# Patient Record
Sex: FEMALE | RURAL
[De-identification: names, ages, dates, MRNs, and addresses within clinical notes are randomized per-mention and may not be internally consistent; named-entity substitution may affect disease eponyms.]

---

## 2019-07-18 ENCOUNTER — APPOINTMENT (OUTPATIENT)
Age: 69
Setting detail: DERMATOLOGY
End: 2019-07-23

## 2019-07-18 DIAGNOSIS — L20.89 OTHER ATOPIC DERMATITIS: ICD-10-CM

## 2019-07-18 DIAGNOSIS — L30.1 DYSHIDROSIS [POMPHOLYX]: ICD-10-CM

## 2019-07-18 PROCEDURE — OTHER MEDICATION COUNSELING: OTHER

## 2019-07-18 PROCEDURE — OTHER COUNSELING: TOPICAL STEROIDS: OTHER

## 2019-07-18 PROCEDURE — OTHER PRESCRIPTION SAMPLES PROVIDED: OTHER

## 2019-07-18 PROCEDURE — OTHER COUNSELING: OTHER

## 2019-07-18 PROCEDURE — 99202 OFFICE O/P NEW SF 15 MIN: CPT

## 2019-07-18 PROCEDURE — OTHER PRESCRIPTION: OTHER

## 2019-07-18 RX ORDER — PREDNISONE 10 MG/1
TABLET ORAL
Qty: 45 | Refills: 0 | Status: ERX | COMMUNITY
Start: 2019-07-18

## 2019-07-18 RX ORDER — CLOBETASOL PROPIONATE 0.5 MG/G
OINTMENT TOPICAL NIGHTLY
Qty: 1 | Refills: 0 | Status: ERX

## 2019-07-18 ASSESSMENT — LOCATION SIMPLE DESCRIPTION DERM
LOCATION SIMPLE: LEFT KNEE
LOCATION SIMPLE: LEFT PLANTAR SURFACE
LOCATION SIMPLE: RIGHT HAND
LOCATION SIMPLE: RIGHT PLANTAR SURFACE
LOCATION SIMPLE: RIGHT ELBOW
LOCATION SIMPLE: LEFT HAND
LOCATION SIMPLE: LEFT ELBOW
LOCATION SIMPLE: RIGHT THIGH

## 2019-07-18 ASSESSMENT — LOCATION ZONE DERM
LOCATION ZONE: LEG
LOCATION ZONE: ARM
LOCATION ZONE: HAND
LOCATION ZONE: FEET

## 2019-07-18 ASSESSMENT — LOCATION DETAILED DESCRIPTION DERM
LOCATION DETAILED: LEFT KNEE
LOCATION DETAILED: LEFT RADIAL PALM
LOCATION DETAILED: RIGHT ELBOW
LOCATION DETAILED: LEFT ELBOW
LOCATION DETAILED: RIGHT ANTERIOR DISTAL THIGH
LOCATION DETAILED: RIGHT MEDIAL PLANTAR MIDFOOT
LOCATION DETAILED: LEFT DORSAL RING FINGER METACARPOPHALANGEAL JOINT
LOCATION DETAILED: LEFT MEDIAL PLANTAR MIDFOOT
LOCATION DETAILED: RIGHT DORSAL MIDDLE FINGER METACARPOPHALANGEAL JOINT
LOCATION DETAILED: RIGHT ULNAR PALM

## 2019-07-18 NOTE — PROCEDURE: MEDICATION COUNSELING
19-Jun-2018 19:12 Dapsone Counseling: I discussed with the patient the risks of dapsone including but not limited to hemolytic anemia, agranulocytosis, rashes, methemoglobinemia, kidney failure, peripheral neuropathy, headaches, GI upset, and liver toxicity.  Patients who start dapsone require monitoring including baseline LFTs and weekly CBCs for the first month, then every month thereafter.  The patient verbalized understanding of the proper use and possible adverse effects of dapsone.  All of the patient's questions and concerns were addressed.

## 2019-07-18 NOTE — HPI: DRY SKIN
How Severe Is Your Dry Skin?: severe
Additional History: Pt reports that peeling skin started approx. 12 hrs after placing generic brand Nicotine patch in 2008. Has had patch testing done approx. 2008/2009, showed allergy to adhesive only.

## 2019-07-18 NOTE — PROCEDURE: MEDICATION COUNSELING
Xelmargarettez Pregnancy And Lactation Text: This medication is Pregnancy Category D and is not considered safe during pregnancy.  The risk during breast feeding is also uncertain.

## 2019-07-18 NOTE — HPI: SECONDARY COMPLAINT
How Severe Is This Condition?: moderate
Additional History: Pt reports rash started on knees after using Tide detergent, has since switched to ALL.

## 2019-08-08 ENCOUNTER — APPOINTMENT (OUTPATIENT)
Age: 69
Setting detail: DERMATOLOGY
End: 2019-08-08

## 2019-08-08 DIAGNOSIS — L30.1 DYSHIDROSIS [POMPHOLYX]: ICD-10-CM

## 2019-08-08 PROCEDURE — 99212 OFFICE O/P EST SF 10 MIN: CPT

## 2019-08-08 PROCEDURE — OTHER ADDITIONAL NOTES: OTHER

## 2019-08-08 PROCEDURE — OTHER COUNSELING: OTHER

## 2019-08-08 PROCEDURE — OTHER PRESCRIPTION: OTHER

## 2019-08-08 RX ORDER — CLOBETASOL PROPIONATE 0.5 MG/G
OINTMENT TOPICAL NIGHTLY
Qty: 1 | Refills: 0 | Status: ERX

## 2019-08-08 ASSESSMENT — LOCATION ZONE DERM
LOCATION ZONE: HAND
LOCATION ZONE: FEET

## 2019-08-08 ASSESSMENT — LOCATION DETAILED DESCRIPTION DERM
LOCATION DETAILED: LEFT MEDIAL PLANTAR MIDFOOT
LOCATION DETAILED: RIGHT RADIAL PALM
LOCATION DETAILED: RIGHT MEDIAL PLANTAR MIDFOOT
LOCATION DETAILED: LEFT LATERAL PLANTAR MIDFOOT
LOCATION DETAILED: LEFT RADIAL PALM

## 2019-08-08 ASSESSMENT — LOCATION SIMPLE DESCRIPTION DERM
LOCATION SIMPLE: RIGHT HAND
LOCATION SIMPLE: LEFT PLANTAR SURFACE
LOCATION SIMPLE: LEFT HAND
LOCATION SIMPLE: RIGHT PLANTAR SURFACE

## 2019-08-08 ASSESSMENT — SEVERITY ASSESSMENT: SEVERITY: CLEAR

## 2019-08-08 NOTE — PROCEDURE: ADDITIONAL NOTES
Detail Level: Simple
Additional Notes: Advised to use topical PRN flare ups. Continue emollient use. Advised to avoid harsh chemicals or detergents.  Follow up for refills or PRN flare ups.

## 2020-08-31 ENCOUNTER — APPOINTMENT (OUTPATIENT)
Dept: RURAL CLINIC 11 | Age: 70
Setting detail: DERMATOLOGY
End: 2020-08-31

## 2020-08-31 DIAGNOSIS — L30.1 DYSHIDROSIS [POMPHOLYX]: ICD-10-CM

## 2020-08-31 DIAGNOSIS — L82.1 OTHER SEBORRHEIC KERATOSIS: ICD-10-CM

## 2020-08-31 PROCEDURE — OTHER PRESCRIPTION: OTHER

## 2020-08-31 PROCEDURE — OTHER COUNSELING: OTHER

## 2020-08-31 PROCEDURE — OTHER ADDITIONAL NOTES: OTHER

## 2020-08-31 PROCEDURE — 99213 OFFICE O/P EST LOW 20 MIN: CPT

## 2020-08-31 RX ORDER — CLOBETASOL PROPIONATE 0.5 MG/G
PEA SIZED OINTMENT TOPICAL NIGHTLY
Qty: 1 | Refills: 1 | Status: ERX | COMMUNITY
Start: 2020-08-31

## 2020-08-31 ASSESSMENT — LOCATION SIMPLE DESCRIPTION DERM
LOCATION SIMPLE: LEFT CHEEK
LOCATION SIMPLE: RIGHT PLANTAR SURFACE
LOCATION SIMPLE: LEFT PLANTAR SURFACE
LOCATION SIMPLE: RIGHT CHEEK

## 2020-08-31 ASSESSMENT — LOCATION DETAILED DESCRIPTION DERM
LOCATION DETAILED: RIGHT SUPERIOR MEDIAL BUCCAL CHEEK
LOCATION DETAILED: LEFT INFERIOR CENTRAL MALAR CHEEK
LOCATION DETAILED: LEFT MEDIAL PLANTAR MIDFOOT
LOCATION DETAILED: RIGHT MEDIAL PLANTAR MIDFOOT
LOCATION DETAILED: LEFT LATERAL PLANTAR MIDFOOT

## 2020-08-31 ASSESSMENT — LOCATION ZONE DERM
LOCATION ZONE: FACE
LOCATION ZONE: FEET

## 2020-09-21 ENCOUNTER — RX ONLY (RX ONLY)
Age: 70
End: 2020-09-21

## 2020-09-21 RX ORDER — CLOBETASOL PROPIONATE 0.5 MG/G
PEA SIZED OINTMENT TOPICAL NIGHTLY
Qty: 1 | Refills: 1 | Status: ERX

## 2021-03-01 ENCOUNTER — APPOINTMENT (OUTPATIENT)
Dept: RURAL CLINIC 11 | Age: 71
Setting detail: DERMATOLOGY
End: 2021-03-02

## 2021-03-01 DIAGNOSIS — L72.0 EPIDERMAL CYST: ICD-10-CM

## 2021-03-01 DIAGNOSIS — L30.1 DYSHIDROSIS [POMPHOLYX]: ICD-10-CM

## 2021-03-01 PROCEDURE — OTHER COUNSELING: OTHER

## 2021-03-01 PROCEDURE — 99212 OFFICE O/P EST SF 10 MIN: CPT

## 2021-03-01 PROCEDURE — OTHER ADDITIONAL NOTES: OTHER

## 2021-03-01 ASSESSMENT — LOCATION SIMPLE DESCRIPTION DERM
LOCATION SIMPLE: RIGHT PLANTAR SURFACE
LOCATION SIMPLE: LEFT PLANTAR SURFACE
LOCATION SIMPLE: LEFT CHEEK

## 2021-03-01 ASSESSMENT — LOCATION DETAILED DESCRIPTION DERM
LOCATION DETAILED: LEFT MEDIAL MALAR CHEEK
LOCATION DETAILED: RIGHT MEDIAL PLANTAR MIDFOOT
LOCATION DETAILED: LEFT MEDIAL PLANTAR MIDFOOT

## 2021-03-01 ASSESSMENT — LOCATION ZONE DERM
LOCATION ZONE: FEET
LOCATION ZONE: FACE

## 2021-03-01 NOTE — PROCEDURE: ADDITIONAL NOTES
Additional Notes: Extracted at no charge
Additional Notes: Advised to use topical PRN flare ups. Continue emollient use. Advised to avoid harsh chemicals or detergents.  Follow up for refills or PRN flare ups.
Detail Level: Simple
Render Risk Assessment In Note?: no

## 2021-12-01 ENCOUNTER — APPOINTMENT (OUTPATIENT)
Dept: RURAL CLINIC 11 | Age: 71
Setting detail: DERMATOLOGY
End: 2021-12-02

## 2021-12-01 DIAGNOSIS — L81.4 OTHER MELANIN HYPERPIGMENTATION: ICD-10-CM

## 2021-12-01 DIAGNOSIS — L30.1 DYSHIDROSIS [POMPHOLYX]: ICD-10-CM

## 2021-12-01 PROCEDURE — OTHER PRESCRIPTION: OTHER

## 2021-12-01 PROCEDURE — OTHER COUNSELING: OTHER

## 2021-12-01 PROCEDURE — OTHER SUNSCREEN RECOMMENDATIONS: OTHER

## 2021-12-01 PROCEDURE — OTHER MIPS QUALITY: OTHER

## 2021-12-01 PROCEDURE — OTHER ADDITIONAL NOTES: OTHER

## 2021-12-01 PROCEDURE — 99213 OFFICE O/P EST LOW 20 MIN: CPT

## 2021-12-01 PROCEDURE — OTHER TREATMENT REGIMEN: OTHER

## 2021-12-01 RX ORDER — CLOBETASOL PROPIONATE 0.5 MG/G
PEA SIZE OINTMENT TOPICAL BID
Qty: 45 | Refills: 1 | Status: ERX

## 2021-12-01 ASSESSMENT — LOCATION ZONE DERM
LOCATION ZONE: HAND
LOCATION ZONE: ARM
LOCATION ZONE: FEET

## 2021-12-01 ASSESSMENT — LOCATION SIMPLE DESCRIPTION DERM
LOCATION SIMPLE: LEFT HAND
LOCATION SIMPLE: RIGHT HAND
LOCATION SIMPLE: RIGHT PLANTAR SURFACE
LOCATION SIMPLE: LEFT PLANTAR SURFACE
LOCATION SIMPLE: LEFT SHOULDER

## 2021-12-01 ASSESSMENT — LOCATION DETAILED DESCRIPTION DERM
LOCATION DETAILED: RIGHT MEDIAL PLANTAR MIDFOOT
LOCATION DETAILED: RIGHT DORSAL MIDDLE METACARPOPHALANGEAL JOINT
LOCATION DETAILED: LEFT MEDIAL PLANTAR MIDFOOT
LOCATION DETAILED: RIGHT ULNAR PALM
LOCATION DETAILED: LEFT ANTERIOR SHOULDER
LOCATION DETAILED: LEFT ULNAR PALM

## 2021-12-01 NOTE — PROCEDURE: TREATMENT REGIMEN
Show Cerave Line: Yes
Action 1: Continue
Detail Level: Zone
Decrease Regimen: Clobetasol ointment as needed for flare ups

## 2021-12-01 NOTE — PROCEDURE: ADDITIONAL NOTES
Additional Notes: Advised to continue to use topical PRN flare ups. Continue emollient use. Advised to continue to avoid harsh chemicals or detergents.  Follow up for refills or PRN flare ups.
Detail Level: Simple

## 2022-01-25 NOTE — PROCEDURE: MEDICATION COUNSELING
Topical Clindamycin Pregnancy And Lactation Text: This medication is Pregnancy Category B and is considered safe during pregnancy. It is unknown if it is excreted in breast milk. Valtrex Counseling: I discussed with the patient the risks of valacyclovir including but not limited to kidney damage, nausea, vomiting and severe allergy.  The patient understands that if the infection seems to be worsening or is not improving, they are to call.

## 2022-06-06 ENCOUNTER — RX ONLY (RX ONLY)
Age: 72
End: 2022-06-06

## 2022-06-06 RX ORDER — CLOBETASOL PROPIONATE 0.5 MG/G
PEA SIZE OINTMENT TOPICAL BID
Qty: 45 | Refills: 1 | Status: ERX

## 2022-10-11 NOTE — PROCEDURE: MEDICATION COUNSELING
Metronidazole Counseling:  I discussed with the patient the risks of metronidazole including but not limited to seizures, nausea/vomiting, a metallic taste in the mouth, nausea/vomiting and severe allergy. Clofazimine Pregnancy And Lactation Text: This medication is Pregnancy Category C and isn't considered safe during pregnancy. It is excreted in breast milk.